# Patient Record
(demographics unavailable — no encounter records)

---

## 2024-11-05 NOTE — PHYSICAL EXAM
[de-identified] : Constitutional o Appearance : well-nourished, well developed, alert, in no acute distress  Head and Face o Head :  Inspection : atraumatic, normocephalic o Face :  Inspection : no visible rash or discoloration Respiratory o Respiratory Effort: breathing unlabored  Neurologic o Sensation : Normal sensation  Psychiatric o Mood and Affect: mood normal, affect appropriate  Lymphatic o Additional Nodes : No palpable lymph nodes present   Cervical Spine o Inspection/Palpation :  Inspection : alignment midline, normal degree of lordosis present  Skin : normal appearance, no masses or tenderness, trachea midline  Palpation : musculature is nontender to palpation o Range of Motion : arc of motion full in all planes, no crepitance or pain with ROM o Tests: Negative Spurlings test  Right Upper Extremity o Right Shoulder :  Inspection/Palpation : no tenderness, no swelling or deformities, wearing a right shoulder sling, severe ecchymosis resolving,   Range of Motion :  ER to 20 degree internal to the abdomen, no crepitance  Strength : forward elevation 5/5, IR 5/5, ER 5/5, ER at 90 of abduction 5/5, supraspinatus 5/5, adduction 5/5, abduction 5/5, biceps/triceps 5/5,  5/5 (not tested)  Stability : no joint instability on provocative testing   Tests: Dacosta negative, Neer negative, Sima negative, drop arm test negative, Opdyke's test negative  o Right Elbow :  Inspection/Palpation : no tenderness, no swelling or deformities  Range of Motion : full forward flexion to 30 degrees,  no crepitance  Strength : flexion and extension 5/5  Stability : no joint instability on provocative testing  o Sensation : sensation intact to light touch o Tests: Tinels Sign negative, no pain with resisted pronation or supination, no pain with resisted extension of the middle finger, no pain with resisted extension of the wrist, no pain with gripping  Left Upper Extremity o Left Shoulder :  Inspection/Palpation : no tenderness, no swelling or deformities  Range of Motion : full and painless in all planes, no crepitance  Strength : forward elevation 5/5, IR 5/5, ER 5/5, ER at 90 of abduction 5/5, supraspinatus 5/5, adduction 5/5, abduction 5/5, biceps/triceps 5/5,  5/5  Stability : no joint instability on provocative testing   Tests: Dacosta negative, Neer negative, Sima negative, drop arm test negative, Opdyke's test negative  Gait and Station: Gait: gait normal, no significant extremity swelling or lymphedema, good proprioception and balance  Radiology Results 11/5/2024 o Right Shoulder: AP internal/external rotation and outlet views were obtained and revealed impacted fracture of the humerus pseudo-subluxation and severe comminution  of the humeral head

## 2024-11-05 NOTE — ADDENDUM
[FreeTextEntry1] : I, FLAVIA AGUILAR, acted solely as a scribe for Dr. Richard Stone on this date 11/05/2024.  All medical record entries made by the Scribe were at my, Dr. Richard Stone, direction and personally dictated by me on 11/05/2024. I have reviewed the chart and agree that the record accurately reflects my personal performance of the history, physical exam, assessment and plan. I have also personally directed, reviewed, and agreed with the chart.

## 2024-11-05 NOTE — HISTORY OF PRESENT ILLNESS
[de-identified] : 88 year old female presents complaining of right shoulder pain x 3 weeks. Patient states that she trip and fell onto her right-side while coming [place] the public library on 10/17/2024 on broken concrete, resulting in a right shoulder injury. Patient was BIBEMS to [facility], patient had XR and CT Right Shoulder, which were consistent with an acute impacted and displaced fracture at the surgical neck of the right humerus. Patient was discharged to [facility], where she is currently participating in PT/OT. Patient was instructed that she would need to wear arm sling x 4 week, pending repeat XR and possible surgery if fracture is not healing. Patient endorses persistent right shoulder pain, described as "soreness" and rated 8/10 on the pain scale. Patient states that pain is worst with movement. Patient admits to taking Tylenol prn, which provides minimal pain relief. At baseline, patient ambulates with a cane for assistance and can perform ADL's indepedently. Patient otherwise states that she has been in her usual state of health. Patient denies any fever, chills, headache, dizziness, shortness of breath, chest pain, palpitations, abdominal pain, new onset urinary/bowel incontinence, saddle paresthesia, or other acute complaints at this time.  AP lateral right knee from 10/18/2024 at 0450. 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 No periprosthetic fractures or suspicious periprosthetic lucencies.  Appearance of patella baja on lateral view, may be positional versus quadriceps tendon injury/insufficiency, correlate clinically with metal artifact reduced MRI suggested to further assess if warranted.  No gross joint effusion.  No tracking gas collections or gross radiographic evidence for osteomyelitis.  Generalized osteopenia otherwise no discrete suspicious lytic or blastic lesions.  --- End of Report ---

## 2024-11-05 NOTE — DISCUSSION/SUMMARY
[de-identified] : I went over the pathophysiology of the patient's symptoms in great detail with the patient. I discussed the underlying pathophysiology of the patient's condition in great detail with the patient. I went over the patient's x-rays with them in great detail. I am recommending the patient continues to go to physical therapy to obtain increases in their strength and mobility. A prescription was provided. We discussed the use of ice, Tylenol and anti-inflammatories to relieve pain. No surgical intervention is recommended at this time. The patient was instructed in ROM exercises they are to do at home. She can take the sling off a few times a day to straighten her elbow. She does not have to wear the sling at night. She can sleep propped up.  She does understand that there is a possibility that surgery may be necessary if she has severe pain and severe restricted motion.  We do not recommend a proximal humeral replacement at this point an 88-year-old female who has basically good position of the fragments   All of their questions were answered. They understand and consent to the plan.   FU in 3-4 weeks.

## 2024-11-26 NOTE — HISTORY OF PRESENT ILLNESS
[de-identified] : 88 year old female presents follow-up evaluation of right shoulder pain 5 weeks ago. Patient states that she trip and fell onto her right-side while coming [place] the public library on 10/17/2024 on broken concrete, resulting in a right shoulder injury. Patient was BIBEMS to [facility], patient had XR and CT Right Shoulder, which were consistent with an acute impacted and displaced fracture at the surgical neck of the right humerus. Patient was discharged to [facility], where she is currently participating in PT/OT. Patient was instructed that she would need to wear arm sling x 4 week, pending repeat XR and possible surgery if fracture is not healing. Patient endorses persistent right shoulder pain, described as "soreness" and rated 8/10 on the pain scale. Patient states that pain is worst with movement. Patient admits to taking Tylenol prn, which provides minimal pain relief. At baseline.   Radiology Results 11/5/2024 o Right Shoulder: AP internal/external rotation and outlet views were obtained and revealed impacted fracture of the humerus pseudo-subluxation and severe comminution  of the humeral head   AP lateral right knee from 10/18/2024 at 0450. 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 No periprosthetic fractures or suspicious periprosthetic lucencies.  Appearance of patella baja on lateral view, may be positional versus quadriceps tendon injury/insufficiency, correlate clinically with metal artifact reduced MRI suggested to further assess if warranted.  No gross joint effusion.  No tracking gas collections or gross radiographic evidence for osteomyelitis.  Generalized osteopenia otherwise no discrete suspicious lytic or blastic lesions.  --- End of Report ---

## 2024-11-26 NOTE — DISCUSSION/SUMMARY
[de-identified] : I went over the pathophysiology of the patient's symptoms in great detail with the patient. I discussed the underlying pathophysiology of the patient's condition in great detail with the patient. I went over the patient's x-rays with them in great detail. I am recommending the patient continues to go to physical therapy to obtain increases in their strength and mobility. A prescription was provided. We discussed the use of ice, Tylenol and anti-inflammatories to relieve pain. The patient was instructed in ROM exercises they are to do at home. She can take the sling off a few times a day to straighten her elbow. She does not have to wear the sling at night. She can sleep propped up. She does understand that there is a possibility that surgery may be necessary if she has severe pain and severe restricted motion. We do not recommend a proximal humeral replacement at this point an 88-year-old female who has basically good position of the fragments.   All of their questions were answered. They understand and consent to the plan.   FU in 3-4 weeks.

## 2024-11-26 NOTE — PHYSICAL EXAM
[de-identified] : Constitutional o Appearance : well-nourished, well developed, alert, in no acute distress  Head and Face o Head :  Inspection : atraumatic, normocephalic o Face :  Inspection : no visible rash or discoloration Respiratory o Respiratory Effort: breathing unlabored  Neurologic o Sensation : Normal sensation  Psychiatric o Mood and Affect: mood normal, affect appropriate  Lymphatic o Additional Nodes : No palpable lymph nodes present   Cervical Spine o Inspection/Palpation :  Inspection : alignment midline, normal degree of lordosis present  Skin : normal appearance, no masses or tenderness, trachea midline  Palpation : musculature is nontender to palpation o Range of Motion : arc of motion full in all planes, no crepitance or pain with ROM o Tests: Negative Spurlings test  Right Upper Extremity o Right Shoulder :  Inspection/Palpation : no tenderness, no swelling or deformities, wearing a right shoulder sling, severe ecchymosis resolving,   Range of Motion :  forward flexion to 85, internal rotation to abdomen, 45-130no crepitance  Strength : forward elevation 3-/5, IR 3-/5, ER 3-/5, ER at 90 of abduction 3-/5, supraspinatus 3-/5, adduction 3-/5, abduction 5/5, biceps/triceps 5/5,  5/5 (not tested)  Stability : no joint instability on provocative testing   Tests: Dacosta negative, Neer negative, Sima negative, drop arm test negative, Ottertail's test negative  o Right Elbow :  Inspection/Palpation : no tenderness, no swelling or deformities  Range of Motion : full forward flexion to 30 degrees,  no crepitance  Strength : flexion and extension 5/5  Stability : no joint instability on provocative testing  o Sensation : sensation intact to light touch o Tests: Tinels Sign negative, no pain with resisted pronation or supination, no pain with resisted extension of the middle finger, no pain with resisted extension of the wrist, no pain with gripping  Left Upper Extremity o Left Shoulder :  Inspection/Palpation : no tenderness, no swelling or deformities  Range of Motion : full and painless in all planes, no crepitance  Strength : forward elevation 5/5, IR 5/5, ER 5/5, ER at 90 of abduction 5/5, supraspinatus 5/5, adduction 5/5, abduction 5/5, biceps/triceps 5/5,  5/5  Stability : no joint instability on provocative testing   Tests: Dacosta negative, Neer negative, Sima negative, drop arm test negative, Ottertail's test negative  Gait and Station: Gait: gait normal, no significant extremity swelling or lymphedema, good proprioception and balance  Radiology Results 11/26/2024 o Right Shoulder : AP internal/external rotation and outlet views were obtained and revealed impacted femoral head fracture in good position

## 2024-12-30 NOTE — ADDENDUM
[FreeTextEntry1] : I, FLAVIA AGUILAR, acted solely as a scribe for Dr. Richard Stone on this date 12/30/2024.  All medical record entries made by the Scribe were at my, Dr. Richard Stone, direction and personally dictated by me on 12/30/2024. I have reviewed the chart and agree that the record accurately reflects my personal performance of the history, physical exam, assessment and plan. I have also personally directed, reviewed, and agreed with the chart.

## 2024-12-30 NOTE — PHYSICAL EXAM
[de-identified] : Constitutional o Appearance : well-nourished, well developed, alert, in no acute distress  Head and Face o Head :  Inspection : atraumatic, normocephalic o Face :  Inspection : no visible rash or discoloration Respiratory o Respiratory Effort: breathing unlabored  Neurologic o Sensation : Normal sensation  Psychiatric o Mood and Affect: mood normal, affect appropriate  Lymphatic o Additional Nodes : No palpable lymph nodes present   Cervical Spine o Inspection/Palpation :  Inspection : alignment midline, normal degree of lordosis present  Skin : normal appearance, no masses or tenderness, trachea midline  Palpation : musculature is nontender to palpation o Range of Motion : arc of motion full in all planes, no crepitance or pain with ROM o Tests: Negative Spurlings test  Right Upper Extremity o Right Shoulder :  Inspection/Palpation : no tenderness, no swelling or deformities, wearing a right shoulder sling, severe ecchymosis resolving,   Range of Motion :  forward flexion to 90, internal rotation to abdomen, abduction to 90 Er -10, no crepitance  Strength : forward elevation 3-/5, IR 3-/5, ER 3-/5, ER at 90 of abduction 3-/5, supraspinatus 3-/5, adduction 3-/5, abduction 5/5, biceps/triceps 5/5,  5/5 (not tested)  Stability : no joint instability on provocative testing   Tests: Dacosta negative, Neer negative, Sima negative, drop arm test negative, Exeter's test negative  o Right Elbow :  Inspection/Palpation : no tenderness, no swelling or deformities  Range of Motion : full forward flexion to 30 degrees,  no crepitance  Strength : flexion and extension 5/5  Stability : no joint instability on provocative testing  o Sensation : sensation intact to light touch o Tests: Tinels Sign negative, no pain with resisted pronation or supination, no pain with resisted extension of the middle finger, no pain with resisted extension of the wrist, no pain with gripping  Left Upper Extremity o Left Shoulder :  Inspection/Palpation : no tenderness, no swelling or deformities  Range of Motion : full and painless in all planes, no crepitance  Strength : forward elevation 5/5, IR 5/5, ER 5/5, ER at 90 of abduction 5/5, supraspinatus 5/5, adduction 5/5, abduction 5/5, biceps/triceps 5/5,  5/5  Stability : no joint instability on provocative testing   Tests: Dacosta negative, Neer negative, Sima negative, drop arm test negative, Exeter's test negative  o Right Knee :  Inspection/Palpation : no tenderness to palpation, no swelling  Range of Motion : active flexion and extension full and painless, no crepitance  Stability : no valgus or varus instability present on provocative testing  Strength : flexion and extension 5/5 extansor lag of 10 degrees  Tests and Signs : negative Anterior Drawer, negative Lachman, negative Surendra's test   o Left Knee :  Inspection/Palpation : no tenderness to palpation, no swelling  Range of Motion :0-100  active flexion and extension full and painless, no crepitance  Stability : no valgus or varus instability present on provocative testing  Strength : flexion and extension 5/5  Tests and Signs : negative Anterior Drawer, negative Lachman, negative Surendra's test   Gait and Station: Gait: gait normal, no significant extremity swelling or lymphedema, good proprioception and balance  Radiology Results 12/30/2024 o Right Shoulder : AP internal/external rotation and outlet views were obtained and revealed no change in comminated right shoulder

## 2024-12-30 NOTE — HISTORY OF PRESENT ILLNESS
[de-identified] : 88 year old female presents follow-up evaluation of right shoulder pain that started 2 months ago. Patient states that she trip and fell onto her right-side while coming [place] the public library on 10/17/2024 on broken concrete, resulting in a right shoulder injury. Patient states she fell of her bed two days ago but her pain is about the same. Patient was BIBEMS to [facility], patient had XR and CT Right Shoulder, which were consistent with an acute impacted and displaced fracture at the surgical neck of the right humerus. Patient was [facility], where she is currently participating in PT/OT. Patient was instructed that she would need to wear arm sling x 4 week, pending repeat XR and possible surgery if fracture is not healing. Patient endorses persistent right shoulder pain, described as "soreness" and rated 6/10 on the pain scale. Patient states that pain is worst with movement. Patient admits to taking Tylenol prn, which provides minimal pain relief. At baseline. Patient states she fell of her bed two days ago but her pain is about the same.    Radiology Results 11/26/2024 o Right Shoulder : AP internal/external rotation and outlet views were obtained and revealed impacted femoral head fracture in good position   Radiology Results 11/5/2024 o Right Shoulder: AP internal/external rotation and outlet views were obtained and revealed impacted fracture of the humerus pseudo-subluxation and severe comminution  of the humeral head   AP lateral right knee from 10/18/2024 at 0450. 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 No periprosthetic fractures or suspicious periprosthetic lucencies.  Appearance of patella baja on lateral view, may be positional versus quadriceps tendon injury/insufficiency, correlate clinically with metal artifact reduced MRI suggested to further assess if warranted.  No gross joint effusion.  No tracking gas collections or gross radiographic evidence for osteomyelitis.  Generalized osteopenia otherwise no discrete suspicious lytic or blastic lesions.  --- End of Report ---

## 2024-12-30 NOTE — DISCUSSION/SUMMARY
[de-identified] : I went over the pathophysiology of the patient's symptoms in great detail with the patient. I discussed the underlying pathophysiology of the patient's condition in great detail with the patient. I went over the patient's x-rays with them in great detail. At this time, she will start a course of physical therapy for strengthening and flexibility. A prescription was provided. We discussed the use of ice, Tylenol and anti-inflammatories to relieve pain. We are prescribing 7.5 Mobic to be taken once a day. A prescription was provided.   All of their questions were answered. They understand and consent to the plan.   FU in 3-4 weeks.

## 2025-01-28 NOTE — HISTORY OF PRESENT ILLNESS
[FreeTextEntry1] : New patient evaluation [de-identified] : Patient is brought in by her daughter, Riya Krause, for evaluation. She fell on 10/17/24 and fractured her right humerus. She was treated with a sling for 6 weeks and had daily rehab. She moved in with her daughter after she got out of rehab because she is no longer independent in her ADLs.  The patient has a history of hypothyroidism, prediabetes, gallstones, HLD, hypertension, osteopenia, left adrenal nodule, and breast cancer. She has been seeing an endocrinologist for management of these conditions.   Her labs were last checked in 9/2024. Her fasting glucose was 107 and HgbA1c was 6.0. Her TSH and Free T4 were normal. Her CBC is consistent with thalassemia trait; her endocrinologist recommended evaluation by her primary doctor for this. Her vitamin B12 was low-normal at 497.

## 2025-01-28 NOTE — PLAN
[FreeTextEntry1] : Schedule CPE? 11104 vs 56531 Labs today? None ordered yet. Check vitamin D? Vitamin D supplement assoc with decreased falls  Continue all medications as prescribed. Check labs as above. Will adjust any medications based upon lab results.  Reviewed age-appropriate preventive screening tests with patient.  Discussed clean eating (eg Mediterranean style eating plan) and regular exercise/staying as physically active as possible.  Include balance exercises and strength training and core strengthening exercises for bone health and to decrease risk for falls.  Reviewed importance of good self care (e.g. meditation, yoga, adequate rest, regular exercise, magnesium, clean eating, etc.).  Follow up for next physical in one year.

## 2025-01-28 NOTE — PHYSICAL EXAM
[No Acute Distress] : no acute distress [Well Nourished] : well nourished [Well Developed] : well developed [Well-Appearing] : well-appearing [Normal Sclera/Conjunctiva] : normal sclera/conjunctiva [PERRL] : pupils equal round and reactive to light [EOMI] : extraocular movements intact [Normal Outer Ear/Nose] : the outer ears and nose were normal in appearance [Normal Oropharynx] : the oropharynx was normal [No JVD] : no jugular venous distention [No Lymphadenopathy] : no lymphadenopathy [Supple] : supple [Thyroid Normal, No Nodules] : the thyroid was normal and there were no nodules present [No Accessory Muscle Use] : no accessory muscle use [No Respiratory Distress] : no respiratory distress  [Clear to Auscultation] : lungs were clear to auscultation bilaterally [Normal Rate] : normal rate  [Regular Rhythm] : with a regular rhythm [Normal S1, S2] : normal S1 and S2 [No Murmur] : no murmur heard [No Carotid Bruits] : no carotid bruits [No Abdominal Bruit] : a ~M bruit was not heard ~T in the abdomen [No Varicosities] : no varicosities [Pedal Pulses Present] : the pedal pulses are present [No Edema] : there was no peripheral edema [No Palpable Aorta] : no palpable aorta [Soft] : abdomen soft [No Extremity Clubbing/Cyanosis] : no extremity clubbing/cyanosis [Non Tender] : non-tender [Non-distended] : non-distended [No Masses] : no abdominal mass palpated [No HSM] : no HSM [Normal Bowel Sounds] : normal bowel sounds [Normal Posterior Cervical Nodes] : no posterior cervical lymphadenopathy [Normal Anterior Cervical Nodes] : no anterior cervical lymphadenopathy [No CVA Tenderness] : no CVA  tenderness [No Spinal Tenderness] : no spinal tenderness [Coordination Grossly Intact] : coordination grossly intact [No Rash] : no rash [No Focal Deficits] : no focal deficits [Normal Gait] : normal gait [Deep Tendon Reflexes (DTR)] : deep tendon reflexes were 2+ and symmetric [Normal Affect] : the affect was normal [Normal Insight/Judgement] : insight and judgment were intact [de-identified] : right arm decreased ROM

## 2025-01-28 NOTE — HEALTH RISK ASSESSMENT
[Any fall with injury in past year] : Patient reported fall with injury in the past year [0] : 2) Feeling down, depressed, or hopeless: Not at all (0) [PHQ-2 Negative - No further assessment needed] : PHQ-2 Negative - No further assessment needed [[id number]] : 0 [Never] : Never

## 2025-01-28 NOTE — ASSESSMENT
[FreeTextEntry1] : She is here for new patient evaluation. She has a history of hypothyroidism, prediabetes, gallstones, HLD, hypertension, osteopenia, left adrenal nodule, and breast cancer.  She had a right humerus fracture on 10/17/24 and has been living with her daughter for the past several months. She previously lived independently in [place].  According to her chart she has had multiple prior fractures (wrist, patella). She is not currently on any treatment for osteopenia.  REVIEW AND UPDATE MEDS WHY IS SHE NOT ON ALENDRONATE? OVERDUE FOR DEXA

## 2025-02-02 NOTE — HISTORY OF PRESENT ILLNESS
[Home] : at home, [unfilled] , at the time of the visit. [Medical Office: ([facility])___] : at the medical office located in  [Verbal consent obtained from patient] : the patient, [unfilled] [de-identified] : Ms. PANTERA RICE is a 88 year old female with Hx of essential HTN, breast Ca, hypothyroid, seen in telemedicine for a post hospitalization and rehab follow up visit and to discuss lab results from September.   Pt is s/p hospitalization (admitted on 10/27/24. stayed for 2 days), after a fall on Oct 17, where she sustained an arm fx, followed by rehab from Oct 19-Nov19 She is currently going for outpatient PT. Denies any SOB, CP, abdominal pain, N/V/D, headache, dizziness, or leg swelling. Reports compliance with taking her meds daily.

## 2025-02-02 NOTE — ADDENDUM
[FreeTextEntry1] : Documented by Imani Simon acting as a scribe for Dr. Diann Mckinnon. 01/28/2025   All medical record entries made by the scribe were at my, Dr. Diann Mckinnon, direction and personally dictated by me on 01/28/2025. I have reviewed the chart and agree that the record accurately reflects my personal performance of the history, physical exam, assessment and plan. I have also personally directed, reviewed, and agreed with the chart.

## 2025-02-02 NOTE — HISTORY OF PRESENT ILLNESS
[Home] : at home, [unfilled] , at the time of the visit. [Medical Office: ([facility])___] : at the medical office located in  [Verbal consent obtained from patient] : the patient, [unfilled] [de-identified] : Ms. PANTERA RICE is a 88 year old female with Hx of essential HTN, breast Ca, hypothyroid, seen in telemedicine for a post hospitalization and rehab follow up visit and to discuss lab results from September.   Pt is s/p hospitalization (admitted on 10/27/24. stayed for 2 days), after a fall on Oct 17, where she sustained an arm fx, followed by rehab from Oct 19-Nov19 She is currently going for outpatient PT. Denies any SOB, CP, abdominal pain, N/V/D, headache, dizziness, or leg swelling. Reports compliance with taking her meds daily.

## 2025-02-02 NOTE — ASSESSMENT
[FreeTextEntry1] : Follow up  s/p hospitalization and rehab s/p fall cont outpatient PT  Hx of HTN cont Lisinopril 30 mg daily cont Hydralazine 25 mg twice daily cont Nebivolol 10 mg daily Reinforced the importance of following a low sodium diet  OAB follows with GYN/Urology cont Myrbetriq 50 mg daily.  lab results from September reviewed and discussed with patient  HLD cont Simvastatin 20 mg daily Reinforced the importance of following a low fat/low cholesterol diet  Hypothyroidism - TSH normal cont Levothyroxine 88 mcg daily Follows with Endocrine  high risk for DM -A1c 6, unchanged from previous Reinforced the importance of following a low sugar/low carbohydrate diet.   CBC stable  elevated BUN increase po fluids

## 2025-02-10 NOTE — HISTORY OF PRESENT ILLNESS
[de-identified] : 88 year old female presents follow-up evaluation of right shoulder pain. She has been doing well, besides from when she was carrying a heavy object. Patient states that she trip and fell onto her right-side while coming [place] the [place] on 10/17/2024 on broken concrete, resulting in a right shoulder injury. Patient states she fell of her bed two days ago but her pain is about the same. Patient was BIBEMS to [facility], patient had XR and CT Right Shoulder, which were consistent with an acute impacted and displaced fracture at the surgical neck of the right humerus. Patient was discharged to [facility], where she is currently participating in PT/OT. Patient was instructed that she would need to wear arm sling x 4 week, pending repeat XR and possible surgery if fracture is not healing. Patient endorses persistent right shoulder pain, described as "soreness" and rated 6/10 on the pain scale. Patient states that pain is worst with movement.

## 2025-02-10 NOTE — DISCUSSION/SUMMARY
[de-identified] : I went over the pathophysiology of the patient's symptoms in great detail with the patient. I discussed the underlying pathophysiology of the patient's condition in great detail with the patient.   Physical therapy script provided for right shoulder to continue to improve ROM and strength  Since patient has increased motion, shoulder replacement surgery is not recommended at this time as she has improving range of motion which is functional and improving strength  FU in 4-6 weeks.

## 2025-02-10 NOTE — PHYSICAL EXAM
[de-identified] : Constitutional o Appearance : well-nourished, well developed, alert, in no acute distress  Head and Face o Head :  Inspection : atraumatic, normocephalic o Face :  Inspection : no visible rash or discoloration Respiratory o Respiratory Effort: breathing unlabored  Neurologic o Sensation : Normal sensation  Psychiatric o Mood and Affect: mood normal, affect appropriate  Lymphatic o Additional Nodes : No palpable lymph nodes present   Cervical Spine o Inspection/Palpation :  Inspection : alignment midline, normal degree of lordosis present  Skin : normal appearance, no masses or tenderness, trachea midline  Palpation : musculature is nontender to palpation o Range of Motion : arc of motion full in all planes, no crepitance or pain with ROM o Tests: Negative Spurlings test  Right Upper Extremity o Right Shoulder :  Inspection/Palpation : no tenderness, no swelling or deformities, wearing a right shoulder sling, severe ecchymosis resolving,   Range of Motion :  forward flexion to 100, internal rotation to abdomen, abduction to 90 Er 30, no crepitance  Strength : forward elevation 3-/5, IR 5/5, ER 5/5, ER at 90 of abduction 5/5, supraspinatus 4/5, adduction 4/5, abduction 5/5, biceps/triceps 5/5,  5/5 (not tested)  Stability : no joint instability on provocative testing   Tests: Dacosta negative, Neer negative, Sima negative, drop arm test negative, [place]'s test negative  o Right Elbow :  Inspection/Palpation : no tenderness, no swelling or deformities  Range of Motion : full forward flexion to 30 degrees,  no crepitance  Strength : flexion and extension 5/5  Stability : no joint instability on provocative testing  o Sensation : sensation intact to light touch o Tests: Tinels Sign negative, no pain with resisted pronation or supination, no pain with resisted extension of the middle finger, no pain with resisted extension of the wrist, no pain with gripping  Left Upper Extremity o Left Shoulder :  Inspection/Palpation : no tenderness, no swelling or deformities  Range of Motion : full and painless in all planes, no crepitance  Strength : forward elevation 5/5, IR 5/5, ER 5/5, ER at 90 of abduction 5/5, supraspinatus 5/5, adduction 5/5, abduction 5/5, biceps/triceps 5/5,  5/5  Stability : no joint instability on provocative testing   Tests: Dacosta negative, Neer negative, Sima negative, drop arm test negative, [place]'s test negative  o Right Knee :  Inspection/Palpation : no tenderness to palpation, no swelling  Range of Motion : active flexion and extension full and painless, no crepitance  Stability : no valgus or varus instability present on provocative testing  Strength : flexion and extension 5/5 extansor lag of 10 degrees  Tests and Signs : negative Anterior Drawer, negative Lachman, negative Surendra's test   o Left Knee :  Inspection/Palpation : no tenderness to palpation, no swelling  Range of Motion :0-100  active flexion and extension full and painless, no crepitance  Stability : no valgus or varus instability present on provocative testing  Strength : flexion and extension 5/5  Tests and Signs : negative Anterior Drawer, negative Lachman, negative Surendra's test   Gait and Station: Gait: gait normal, no significant extremity swelling or lymphedema, good proprioception and balance  Right shoulder xray 2/10/25: impacted, severely comminuted right proximal humerus fracture with progressive healing

## 2025-03-10 NOTE — PHYSICAL EXAM
[de-identified] : Constitutional o Appearance : well-nourished, well developed, alert, in no acute distress  Head and Face o Head :  Inspection : atraumatic, normocephalic o Face :  Inspection : no visible rash or discoloration Respiratory o Respiratory Effort: breathing unlabored  Neurologic o Sensation : Normal sensation  Psychiatric o Mood and Affect: mood normal, affect appropriate  Lymphatic o Additional Nodes : No palpable lymph nodes present   Cervical Spine o Inspection/Palpation :  Inspection : alignment midline, normal degree of lordosis present  Skin : normal appearance, no masses or tenderness, trachea midline  Palpation : musculature is nontender to palpation o Range of Motion : arc of motion full in all planes, no crepitance or pain with ROM o Tests: Negative Spurlings test  Right Upper Extremity o Right Shoulder :  Inspection/Palpation : no tenderness, no swelling or deformities, wearing a right shoulder sling, severe ecchymosis resolving,   Range of Motion :  forward flexion to 110, internal rotation to abdomen, abduction to 90 ER to 35 degrees,  sym to the other side no crepitance  Strength : forward elevation 5/5, IR 5/5, ER 5/5, ER at 90 of abduction 5/5, supraspinatus 4/5, adduction 4/5, abduction 5/5, biceps/triceps 5/5,  5/5 (not tested)  Stability : no joint instability on provocative testing   Tests: Dacosta negative, Neer negative, Sima negative, drop arm test negative, [place]'s test negative  o Right Elbow :  Inspection/Palpation : no tenderness, no swelling or deformities  Range of Motion : full forward flexion to 30 degrees,  no crepitance  Strength : flexion and extension 5/5  Stability : no joint instability on provocative testing  o Sensation : sensation intact to light touch o Tests: Tinels Sign negative, no pain with resisted pronation or supination, no pain with resisted extension of the middle finger, no pain with resisted extension of the wrist, no pain with gripping  Left Upper Extremity o Left Shoulder :  Inspection/Palpation : no tenderness, no swelling or deformities  Range of Motion : full and painless in all planes, no crepitance  Strength : forward elevation 5/5, IR 5/5, ER 5/5, ER at 90 of abduction 5/5, supraspinatus 5/5, adduction 5/5, abduction 5/5, biceps/triceps 5/5,  5/5  Stability : no joint instability on provocative testing   Tests: Dacosta negative, Neer negative, Sima negative, drop arm test negative, [place]'s test negative  o Right Knee :  Inspection/Palpation : no tenderness to palpation, no swelling  Range of Motion : active flexion and extension full and painless, no crepitance  Stability : no valgus or varus instability present on provocative testing  Strength : flexion and extension 5/5 extansor lag of 10 degrees  Tests and Signs : negative Anterior Drawer, negative Lachman, negative Surendra's test   o Left Knee :  Inspection/Palpation : no tenderness to palpation, no swelling  Range of Motion :0-100  active flexion and extension full and painless, no crepitance  Stability : no valgus or varus instability present on provocative testing  Strength : flexion and extension 5/5  Tests and Signs : negative Anterior Drawer, negative Lachman, negative Surendra's test   Gait and Station: Gait: gait normal, no significant extremity swelling or lymphedema, good proprioception and balance  Radiology Results 3/10/2025  o Right Shoulder : AP internal/external rotation and outlet views were obtained and revealed severe comminuted right humerus fracture healing nicely severe degenerative arthritis right shoulder o Hip and Pelvis: AP pelvis and lateral of both hips are obtained and revealed right total hip replacement in good position, moderate degenerative arthritis of the left hip  o Right Knee : Standing AP, lateral and skyline views of the knee were obtained and revealed good position of the total knee replacement with patella baja

## 2025-03-10 NOTE — ADDENDUM
[FreeTextEntry1] : I, FLAVIA AGUILAR, acted solely as a scribe for Dr. Richard Stone on this date 03/10/2025.  All medical record entries made by the Scribe were at my, Dr. Richard Stone, direction and personally dictated by me on 03/10/2025. I have reviewed the chart and agree that the record accurately reflects my personal performance of the history, physical exam, assessment and plan. I have also personally directed, reviewed, and agreed with the chart.

## 2025-03-10 NOTE — HISTORY OF PRESENT ILLNESS
[de-identified] : 88 year old female presents follow-up evaluation of right shoulder pain. She has been doing well, besides from when she was carrying a heavy object. Patient states that she trip and fell onto her right-side while coming [place] the public library on 10/17/2024 on broken concrete, resulting in a right shoulder injury. Patient was discharged to [facility], where she is currently participating in PT/OT. Patient was instructed that she would need to wear arm sling x 4 week, pending repeat XR and possible surgery if fracture is not healing. Patient endorses persistent right shoulder pain, described as "soreness" and rated 6/10 on the pain scale. She complains of right thigh and knee pain since her accident.  She is status post right total hip replacement and revision right knee replacement and is known to have a chronic extensor lag.  Right shoulder xray 2/10/25: impacted, severely comminuted right proximal humerus fracture with progressive healing

## 2025-03-10 NOTE — DISCUSSION/SUMMARY
[de-identified] : I went over the pathophysiology of the patient's symptoms in great detail with the patient. I discussed the underlying pathophysiology of the patient's condition in great detail with the patient. I went over the patient's x-rays with them in great detail. At this time, she will start a course of physical therapy for strengthening and flexibility. A prescription was provided. We discussed the use of ice, Tylenol and anti-inflammatories to relieve pain.   All of their questions were answered. They understand and consent to the plan.   FU in 6 weeks

## 2025-03-11 NOTE — DISCUSSION/SUMMARY
[FreeTextEntry1] : PANTERA is a 88 year female who presents for KHUSHBOO, WILBER > OAB. On exam, positive CST, normal PVR. She had episodes of "black specs" in urine, possibly blood. Failed PTNS.  We discussed management options including observation, pelvic floor exercises with or without physical therapy, pessary, impressa, medications including imipramine and surgical management including urethral bulking agents, fascial sling, martinez and midurethral sling with mesh. IUGA information for PFE and WILBER given to patient.  We reviewed her symptoms and exam findings. We discussed management options for overactive bladder including observation, behavioral modifications and bladder training, physical therapy and medications including anticholinergics and beta 3 agonists. We discussed if behavioral modifications and medications fail proceeding with urodynamics to further evaluate her symptoms. We discussed additional treatment options including sacral neuromodulation, PTNS and intra detrusor Botox. IUGA handout on overactive bladder and bladder training was given to her.   [] UA, Cx, Cytology [] CT Urogram ordered  [] Periurethral bulking - diagnostic cysto at same time for gross hematuria.    f/u bulking next appt  All questions answered.

## 2025-03-11 NOTE — REASON FOR VISIT
[Initial Visit ___] : an initial visit for [unfilled] [Questionnaire Received] : Patient questionnaire received [Intake Form Reviewed] : Patient intake form with past medical history, surgical history, family history and social history reviewed today [Urinary Incontinence] : urinary incontinence [Blood In Urine] : blood in urine

## 2025-03-11 NOTE — REASON FOR VISIT
Detail Level: Simple Additional Notes: Patient consent was obtained to proceed with the visit and recommended plan of care after discussion of all risks and benefits, including the risks of COVID-19 exposure. Render Risk Assessment In Note?: yes Render Risk Assessment In Note?: no Additional Notes: Previous dermatology recommended Fluoruracil cream to treat Ak areas, called previous dermatology for chart notes. Discussed using Fluoruracil cream in October. [Initial Visit ___] : an initial visit for [unfilled] [Questionnaire Received] : Patient questionnaire received [Intake Form Reviewed] : Patient intake form with past medical history, surgical history, family history and social history reviewed today [Urinary Incontinence] : urinary incontinence [Blood In Urine] : blood in urine
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
## 2025-03-11 NOTE — PHYSICAL EXAM
[Chaperone Present] : A chaperone was present in the examining room during all aspects of the physical examination [No Acute Distress] : in no acute distress [Well developed] : well developed [Well Nourished] : ~L well nourished [Oriented x3] : oriented to person, place, and time [No Edema] : ~T edema was not present [Warm and Dry] : was warm and dry to touch [Vulvar Atrophy] : vulvar atrophy [Labia Majora] : were normal [Labia Minora] : were normal [Normal Appearance] : general appearance was normal [Atrophy] : atrophy [Post Void Residual ____ml] : post void residual was [unfilled] ml [Aa ____] : Aa [unfilled] [Ba ____] : Ba [unfilled] [Normal] : normal [Soft] :  the cervix was soft [Uterine Adnexae] : were not tender and not enlarged [FreeTextEntry2] :  Tamera Shen [Cough] : no cough [Tenderness] : ~T no ~M abdominal tenderness observed [Distended] : not distended [FreeTextEntry3] : pos CST

## 2025-03-11 NOTE — OB HISTORY
[Vaginal ___] : [unfilled] vaginal delivery(s) [Definite ___ (Date)] : the last menstrual period was [unfilled] [Last Pap Smear ___] : date of last pap smear was on [unfilled] [Abnormal Pap Smear] : normal pap smear [Taking Estrogens] : is not taking estrogen replacement [Sexually Active] : is not sexually active [FreeTextEntry1] : Largest baby 6lbs 7oz

## 2025-03-11 NOTE — ADDENDUM
[FreeTextEntry1] : This note was written by Tamera Shen, acting as the  for Dr. Cruz. This note accurately reflects the work and decisions made by Dr. Cruz.

## 2025-03-11 NOTE — DISCUSSION/SUMMARY
[FreeTextEntry1] : PNATERA is a 88 year female who presents for KHUSHBOO, WILBER > OAB. On exam, positive CST, normal PVR. She had episodes of "black specs" in urine, possibly blood. Failed PTNS.  We discussed management options including observation, pelvic floor exercises with or without physical therapy, pessary, impressa, medications including imipramine and surgical management including urethral bulking agents, fascial sling, martinez and midurethral sling with mesh. IUGA information for PFE and WILBER given to patient.  We reviewed her symptoms and exam findings. We discussed management options for overactive bladder including observation, behavioral modifications and bladder training, physical therapy and medications including anticholinergics and beta 3 agonists. We discussed if behavioral modifications and medications fail proceeding with urodynamics to further evaluate her symptoms. We discussed additional treatment options including sacral neuromodulation, PTNS and intra detrusor Botox. IUGA handout on overactive bladder and bladder training was given to her.   [] UA, Cx, Cytology [] CT Urogram ordered  [] Periurethral bulking - diagnostic cysto at same time for gross hematuria.    f/u bulking next appt  All questions answered.

## 2025-04-21 NOTE — PHYSICAL EXAM
[de-identified] : Constitutional o Appearance : well-nourished, well developed, alert, in no acute distress  Head and Face o Head :  Inspection : atraumatic, normocephalic o Face :  Inspection : no visible rash or discoloration Respiratory o Respiratory Effort: breathing unlabored  Neurologic o Sensation : Normal sensation  Psychiatric o Mood and Affect: mood normal, affect appropriate  Lymphatic o Additional Nodes : No palpable lymph nodes present   Cervical Spine o Inspection/Palpation :  Inspection : alignment midline, normal degree of lordosis present  Skin : normal appearance, no masses or tenderness, trachea midline  Palpation : musculature is nontender to palpation o Range of Motion : arc of motion full in all planes, no crepitance or pain with ROM o Tests: Negative Spurlings test  Right Upper Extremity o Right Shoulder :  Inspection/Palpation : no tenderness, no swelling or deformities, wearing a right shoulder sling, severe ecchymosis resolving,   Range of Motion :  forward flexion to 110, ER to 30, IR symmetrical, no crepitance  Strength : forward elevation 5/5, IR 5/5, ER 5/5, ER at 90 of abduction 5/5, supraspinatus 5/5, adduction 5/5, abduction 5/5, biceps/triceps 5/5,  5/5 (not tested)  Stability : no joint instability on provocative testing   Tests: Dacosta negative, Neer negative, Sima negative, drop arm test negative, Sedgwick's test negative  o Right Elbow :  Inspection/Palpation : no tenderness, no swelling or deformities  Range of Motion : full forward flexion to 30 degrees,  no crepitance  Strength : flexion and extension 5/5  Stability : no joint instability on provocative testing  o Sensation : sensation intact to light touch o Tests: Tinels Sign negative, no pain with resisted pronation or supination, no pain with resisted extension of the middle finger, no pain with resisted extension of the wrist, no pain with gripping  Left Upper Extremity o Left Shoulder :  Inspection/Palpation : no tenderness, no swelling or deformities  Range of Motion : full and painless in all planes, no crepitance  Strength : forward elevation 5/5, IR 5/5, ER 5/5, ER at 90 of abduction 5/5, supraspinatus 5/5, adduction 5/5, abduction 5/5, biceps/triceps 5/5,  5/5  Stability : no joint instability on provocative testing   Tests: Dacosta negative, Neer negative, Sima negative, drop arm test negative, [place]'s test negative  o Right Hip :  Inspection/Palpation : no tenderness, no swelling or deformities  Range of Motion : full and painless in all planes, no crepitance  Stability : joint stability intact  Strength : extension, flexion, adduction, abduction, internal rotation and external rotation 3+/5  Tests: Obers test negative   o Right Knee :  Inspection/Palpation : no tenderness to palpation, no swelling  Range of Motion : 0-130 active flexion and extension full and painless, no crepitance  Stability : no valgus or varus instability present on provocative testing  Strength : flexion and extension 3+/5 can lift but weak  Tests and Signs : negative Anterior Drawer, negative Lachman, negative Surendra's test   o Left Knee :  Inspection/Palpation : no tenderness to palpation, no swelling  Range of Motion :0-100  active flexion and extension full and painless, no crepitance  Stability : no valgus or varus instability present on provocative testing  Strength : flexion and extension 5/5  Tests and Signs : negative Anterior Drawer, negative Lachman, negative Surendra's test   Gait and Station: Gait: Patient presents ambulating with a cane, 3 quarters of an inch on the left, no significant extremity swelling or lymphedema, good proprioception and balance  Radiology Results 4/21/2025 o Right Shoulder : AP internal/external rotation and outlet views were obtained and revealed comminuted right humerus fracture healing nicely concentric reduction

## 2025-04-21 NOTE — ADDENDUM
[FreeTextEntry1] : I, FLAVIA AGUILAR, acted solely as a scribe for Dr. Richard Stone on this date 04/21/2025.  All medical record entries made by the Scribe were at my, Dr. Richard Stone, direction and personally dictated by me on 04/21/2025. I have reviewed the chart and agree that the record accurately reflects my personal performance of the history, physical exam, assessment and plan. I have also personally directed, reviewed, and agreed with the chart.

## 2025-04-21 NOTE — DISCUSSION/SUMMARY
[de-identified] : I went over the pathophysiology of the patient's symptoms in great detail with the patient. I discussed the underlying pathophysiology of the patient's condition in great detail with the patient. I went over the patient's x-rays with them in great detail. The patient was instructed in ROM exercises they are to do at home. She should purchase an over-the-door pulley and begin a diligent at-home exercise program. At this time, she will start a course of physical therapy for strengthening and flexibility. A prescription was provided. Proper walker walking protocol was discussed and demonstrated with the patient. A discussion ensued about shoe wear modifications. I recommend the patient uses a lift in her left shoe. A prescription was provided.  All of their questions were answered. They understand and consent to the plan.   FU in 4-6 weeks. 
Never smoker

## 2025-04-21 NOTE — HISTORY OF PRESENT ILLNESS
[de-identified] : 88 year old female presents follow-up evaluation of right shoulder pain. She has been doing well, besides from when she was carrying a heavy object. Patient states that she trip and fell onto her right-side while coming [place] the public library on 10/17/2024 on broken concrete, resulting in a right shoulder injury. She is currently participating in PT/OT. Patient endorses intermittent right shoulder pain and swelling, described as "soreness". She complains of right thigh and knee pain since her accident. She is status post right total hip replacement and revision right knee replacement done at [facility] and is known to have a chronic extensor lag. She notes her right knee elina when she walks. Patient presents ambulating with a cane.   Radiology Results 3/10/2025  o Right Shoulder : AP internal/external rotation and outlet views were obtained and revealed severe comminuted right humerus fracture healing nicely severe degenerative arthritis right shoulder o Hip and Pelvis: AP pelvis and lateral of both hips are obtained and revealed right total hip replacement in good position, moderate degenerative arthritis of the left hip  o Right Knee : Standing AP, lateral and skyline views of the knee were obtained and revealed good position of the total knee replacement with patella baja   Right shoulder xray 2/10/25: impacted, severely comminuted right proximal humerus fracture with progressive healing

## 2025-05-08 NOTE — PHYSICAL EXAM
[Alert] : alert [Well Nourished] : well nourished [Healthy Appearance] : healthy appearance [No Acute Distress] : no acute distress [Well Developed] : well developed [Normal Voice/Communication] : normal voice communication [Normal Sclera/Conjunctiva] : normal sclera/conjunctiva [No Proptosis] : no proptosis [No Neck Mass] : no neck mass was observed [No LAD] : no lymphadenopathy [Supple] : the neck was supple [No Respiratory Distress] : no respiratory distress [Normal Affect] : the affect was normal [Normal Insight/Judgement] : insight and judgment were intact [Normal Mood] : the mood was normal

## 2025-05-14 NOTE — ASSESSMENT
[FreeTextEntry1] : This is a 88-year-old female with primary hypothyroidism, prediabetes, gallstones, HLD, hypertension, osteopenia, left 1cm adrenal nodule vs left adrenal adenomatous hyperplasia, breast cancer, here for follow up. 1. Primary hypothyroidism Check TFTs.  Cont Synthroid 88mcg qd pending results.  2.Osteopenia She was on Fosamax in the past but stopped it in 2011 for a drug holiday. She was on Prolia and last dose was in 2017. She has not completed dental work. She sustained a right wrist fracture a few years ago after a fall. Check 25 vit D.  Check DEXA 3. Left 1cm adrenal nodule vs left adrenal adenomatous hyperplasia Noted on CT chest from 2016.  Recent CT abdomen from May 2022 showed mild low attenuation nodular thickening of left adrenal gland. Check for hormonal hyperfunction 4. PreDM LSM Check hemoglobin A1c.

## 2025-05-14 NOTE — HISTORY OF PRESENT ILLNESS
[FreeTextEntry1] : CC: hypothyroidism  This is a 88-year-old female with primary hypothyroidism, prediabetes, gallstones, HLD, hypertension, osteopenia, left 1cm adrenal nodule vs left adrenal adenomatous hyperplasia, breast cancer, here for follow up. She was diagnosed with hypothyroidism at age 63. She is currently on Synthroid 88 mcg daily. She takes this in the morning on an empty stomach. She was on Fosamax in the past but stopped it in 2011 for a drug holiday. She was on Prolia and last dose was in 2017. She has not completed dental work. She sustained a right wrist fracture a few years ago after a fall.  She had a CT scan of the chest without contrast in June 2016 which showed stable 1 cm left adrenal nodule. CT abdomen from May 2022 showed mild low-attenuation nodular thickening of left adrenal gland consistent with adenomatous hyperplasia.  She was hospitalized from 10/18/2024 - 10/21/2024 for an acute impacted fracture of the right humerus after a fall from step.  Undergoing physical therapy.

## 2025-05-14 NOTE — ADDENDUM
[FreeTextEntry1] :  By signing my name below, I, Delia Villalobos, attest that this document has been prepared under the direction and in the presence of Dr. Dunaway.  I, Josafat Dunaway MD, personally performed the services described in this documentation. All medical record entries made by the scribe were at my discretion and in my presence. I have reviewed the chart and discharge instructions (if applicable) and agree that the record reflects my personal permanence and is accurate and complete.

## 2025-05-14 NOTE — REVIEW OF SYSTEMS
[Back Pain] : back pain [All other systems negative] : All other systems negative [FreeTextEntry9] : Right arm stiffness

## 2025-05-27 NOTE — DISCUSSION/SUMMARY
[de-identified] : I went over the pathophysiology of the patient's symptoms in great detail with the patient. I am recommending the patient continues to go to physical therapy to obtain increases in their strength and mobility. A prescription was provided. At-home strengthening exercises were discussed and demonstrated with the patient. She should purchase an over-the-door pulley and begin a diligent at-home exercise program. We discussed the use of ice, Tylenol and anti-inflammatories to relieve pain.   All of their questions were answered. They understand and consent to the plan.   FU in 4-6 weeks. We will get right shoulder x rays upon return.

## 2025-05-27 NOTE — PHYSICAL EXAM
[de-identified] : Constitutional o Appearance : well-nourished, well developed, alert, in no acute distress  Head and Face o Head :  Inspection : atraumatic, normocephalic o Face :  Inspection : no visible rash or discoloration Respiratory o Respiratory Effort: breathing unlabored  Neurologic o Sensation : Normal sensation  Psychiatric o Mood and Affect: mood normal, affect appropriate  Lymphatic o Additional Nodes : No palpable lymph nodes present   Cervical Spine o Inspection/Palpation :  Inspection : alignment midline, normal degree of lordosis present  Skin : normal appearance, no masses or tenderness, trachea midline  Palpation : musculature is nontender to palpation o Range of Motion : arc of motion full in all planes, no crepitance or pain with ROM o Tests: Negative Spurlings test  Right Upper Extremity o Right Shoulder :  Inspection/Palpation : no anterior capsular tenderness, no swelling or deformities, wearing a right shoulder sling, severe ecchymosis resolving,   Range of Motion :  symmetrical forward flexion, limited IR and limited ER 15 degrees, no crepitance  Strength : forward elevation 5/5, IR 5/5, ER 5/5, ER at 90 of abduction 4+/5, supraspinatus 5/5, adduction 5/5, abduction 5/5, biceps/triceps 5/5,  5/5 (not tested)  Stability : no joint instability on provocative testing   Tests: Dacosta negative, Neer negative, Sima negative, drop arm test negative, Hubbard's test negative  o Right Elbow :  Inspection/Palpation : no tenderness, no swelling or deformities  Range of Motion : full forward flexion to 30 degrees,  no crepitance  Strength : flexion and extension 5/5  Stability : no joint instability on provocative testing  o Sensation : sensation intact to light touch o Tests: Tinels Sign negative, no pain with resisted pronation or supination, no pain with resisted extension of the middle finger, no pain with resisted extension of the wrist, no pain with gripping  Left Upper Extremity o Left Shoulder :  Inspection/Palpation : no tenderness, no swelling or deformities  Range of Motion : full and painless in all planes, no crepitance  Strength : forward elevation 5/5, IR 5/5, ER 5/5, ER at 90 of abduction 4+/5, supraspinatus 5/5, adduction 5/5, abduction 5/5, biceps/triceps 5/5,  5/5  Stability : no joint instability on provocative testing   Tests: Dacosta negative, Neer negative, Sima negative, drop arm test negative, Hubbard's test negative  o Right Hip :  Inspection/Palpation : no tenderness, no swelling or deformities  Range of Motion : full and painless in all planes, no crepitance  Stability : joint stability intact  Strength : extension, flexion, adduction, abduction, internal rotation and external rotation 3+/5  Tests: Obers test negative   o Right Knee :  Inspection/Palpation : no tenderness to palpation, no swelling  Range of Motion : 0-130 active flexion and extension full and painless, no crepitance  Stability : no valgus or varus instability present on provocative testing  Strength : flexion and extension 3+/5 can lift but weak  Tests and Signs : negative Anterior Drawer, negative Lachman, negative Surendra's test   o Left Knee :  Inspection/Palpation : no tenderness to palpation, no swelling  Range of Motion :0-100  active flexion and extension full and painless, no crepitance  Stability : no valgus or varus instability present on provocative testing  Strength : flexion and extension 5/5  Tests and Signs : negative Anterior Drawer, negative Lachman, negative Surendra's test   Gait and Station: Gait: Patient presents ambulating with a cane, 3 quarters of an inch on the left, no significant extremity swelling or lymphedema, good proprioception and balanc
Patient

## 2025-05-27 NOTE — ADDENDUM
[FreeTextEntry1] : I, FLAVIA AGUILAR, acted solely as a scribe for Dr. Richard Stone on this date 05/27/2025.  All medical record entries made by the Scribe were at my, Dr. Richard Stone, direction and personally dictated by me on 05/27/2025. I have reviewed the chart and agree that the record accurately reflects my personal performance of the history, physical exam, assessment and plan. I have also personally directed, reviewed, and agreed with the chart.

## 2025-05-27 NOTE — HISTORY OF PRESENT ILLNESS
101 degrees F
[de-identified] : 88 year old female presents follow-up evaluation of right shoulder pain. She has been doing well, besides from when she was carrying a heavy object. Patient states that she trip and fell onto her right-side while coming out the public library on 10/17/2024 on broken concrete, resulting in a right shoulder injury. She has not had physical therapy for 3 weeks due to her physical therapist being on vacation. She would like to start up again. She has been using the pulley at home. She has difficulty washing her hair. She is status post right total hip replacement and revision right knee replacement done at [facility] and is known to have a chronic extensor lag. She notes her right knee elina when she walks. Patient presents ambulating with a cane.   Radiology Results 4/21/2025 o Right Shoulder : AP internal/external rotation and outlet views were obtained and revealed comminuted right humerus fracture healing nicely concentric reduction  Radiology Results 3/10/2025  o Right Shoulder : AP internal/external rotation and outlet views were obtained and revealed severe comminuted right humerus fracture healing nicely severe degenerative arthritis right shoulder o Hip and Pelvis: AP pelvis and lateral of both hips are obtained and revealed right total hip replacement in good position, moderate degenerative arthritis of the left hip  o Right Knee : Standing AP, lateral and skyline views of the knee were obtained and revealed good position of the total knee replacement with patella baja   Right shoulder xray 2/10/25: impacted, severely comminuted right proximal humerus fracture with progressive healing

## 2025-07-08 NOTE — HISTORY OF PRESENT ILLNESS
[de-identified] : 88 year old female presents follow-up evaluation of right shoulder pain. She has been doing well, besides from when she was carrying a heavy grocery bags. Patient states that she trip and fell onto her right-side while coming out the public library on 10/17/2024 on broken concrete, resulting in a right shoulder injury. She has not had physical therapy for 3 weeks due to her physical therapist being on vacation. She would like to start up again. She has been using the pulley at home. She has difficulty washing her hair. She is status post right total hip replacement and revision right knee replacement done at [facility] and is known to have a chronic extensor lag. She notes her right knee elina when she walks. Patient presents ambulating with a cane.   Radiology Results 4/21/2025 o Right Shoulder : AP internal/external rotation and outlet views were obtained and revealed comminuted right humerus fracture healing nicely concentric reduction  Radiology Results 3/10/2025  o Right Shoulder : AP internal/external rotation and outlet views were obtained and revealed severe comminuted right humerus fracture healing nicely severe degenerative arthritis right shoulder o Hip and Pelvis: AP pelvis and lateral of both hips are obtained and revealed right total hip replacement in good position, moderate degenerative arthritis of the left hip  o Right Knee : Standing AP, lateral and skyline views of the knee were obtained and revealed good position of the total knee replacement with patella baja   Right shoulder xray 2/10/25: impacted, severely comminuted right proximal humerus fracture with progressive healing

## 2025-07-08 NOTE — DISCUSSION/SUMMARY
[de-identified] : I went over the pathophysiology of the patient's symptoms in great detail with the patient. I discussed the underlying pathophysiology of the patient's condition in great detail with the patient. I went over the patient's x-rays with them in great detail. At this time, she will start a course of physical therapy for strengthening and flexibility. A prescription was provided. We discussed the use of ice, Tylenol and anti-inflammatories to relieve pain. The patient was instructed in ROM exercises they are to do at home.   All of their questions were answered. They understand and consent to the plan.   FU in 4-6 weeks.

## 2025-07-08 NOTE — ADDENDUM
[FreeTextEntry1] : I, FLAVIA AGUILAR, acted solely as a scribe for Dr. Richard Stone on this date 07/08/2025.  All medical record entries made by the Scribe were at my, Dr. Richard Stone, direction and personally dictated by me on 07/08/2025. I have reviewed the chart and agree that the record accurately reflects my personal performance of the history, physical exam, assessment and plan. I have also personally directed, reviewed, and agreed with the chart.

## 2025-07-08 NOTE — PHYSICAL EXAM
[de-identified] : Constitutional o Appearance : well-nourished, well developed, alert, in no acute distress  Head and Face o Head :  Inspection : atraumatic, normocephalic o Face :  Inspection : no visible rash or discoloration Respiratory o Respiratory Effort: breathing unlabored  Neurologic o Sensation : Normal sensation  Psychiatric o Mood and Affect: mood normal, affect appropriate  Lymphatic o Additional Nodes : No palpable lymph nodes present   Cervical Spine o Inspection/Palpation :  Inspection : alignment midline, normal degree of lordosis present  Skin : normal appearance, no masses or tenderness, trachea midline  Palpation : musculature is nontender to palpation o Range of Motion : arc of motion full in all planes, no crepitance or pain with ROM o Tests: Negative Spurlings test  Right Upper Extremity o Right Shoulder :  Inspection/Palpation : no anterior capsular tenderness, no swelling or deformities, wearing a right shoulder sling, severe ecchymosis resolving,   Range of Motion :  symmetrical forward flexion to 100, limited IR but symmetrical to the other side and limited ER 15 degrees, no crepitance  Strength : forward elevation 5/5, IR 5/5, ER 5/5, ER at 90 of abduction 4+/5, supraspinatus 5/5, adduction 5/5, abduction 5/5, biceps/triceps 5/5,  5/5 (not tested)  Stability : no joint instability on provocative testing   Tests: Dacosta negative, Neer negative, Sima negative, drop arm test negative, [place]'s test negative  o Right Elbow :  Inspection/Palpation : no tenderness, no swelling or deformities  Range of Motion : full forward flexion to 100 degrees,  ER to 10 degrees, IR symmetrical and full, no crepitance  Strength : flexion and extension 5/5  Stability : no joint instability on provocative testing  o Sensation : sensation intact to light touch o Tests: Tinels Sign negative, no pain with resisted pronation or supination, no pain with resisted extension of the middle finger, no pain with resisted extension of the wrist, no pain with gripping  Left Upper Extremity o Left Shoulder :  Inspection/Palpation : no tenderness, no swelling or deformities  Range of Motion : full and painless in all planes, no crepitance  Strength : forward elevation 5/5, IR 5/5, ER 5/5, ER at 90 of abduction 4+/5, supraspinatus 5/5, adduction 5/5, abduction 5/5, biceps/triceps 5/5,  5/5  Stability : no joint instability on provocative testing   Tests: Dacosta negative, Neer negative, Sima negative, drop arm test negative, [place]'s test negative  o Right Hip :  Inspection/Palpation : no tenderness, no swelling or deformities  Range of Motion : full and painless in all planes, no crepitance  Stability : joint stability intact  Strength : extension, flexion, adduction, abduction, internal rotation and external rotation 3+/5  Tests: Obers test negative   o Right Knee :  Inspection/Palpation : no tenderness to palpation, no swelling  Range of Motion : 0-130 active flexion and extension full and painless, no crepitance  Stability : no valgus or varus instability present on provocative testing  Strength : flexion and extension 3+/5 can lift but weak  Tests and Signs : negative Anterior Drawer, negative Lachman, negative Surendra's test   o Left Knee :  Inspection/Palpation : no tenderness to palpation, no swelling  Range of Motion :0-100  active flexion and extension full and painless, no crepitance  Stability : no valgus or varus instability present on provocative testing  Strength : flexion and extension 5/5  Tests and Signs : negative Anterior Drawer, negative Lachman, negative Surendra's test   Gait and Station: Gait: Patient presents ambulating with a cane, 3 quarters of an inch on the left, no significant extremity swelling or lymphedema, good proprioception and balance  Radiology Results 7/8/2025  o Right Shoulder : AP internal/external rotation and outlet views were obtained and revealed impacted comminuted right humerus fracture no change from previous x-rays